# Patient Record
Sex: MALE | Race: WHITE | NOT HISPANIC OR LATINO
[De-identification: names, ages, dates, MRNs, and addresses within clinical notes are randomized per-mention and may not be internally consistent; named-entity substitution may affect disease eponyms.]

---

## 2019-11-21 PROBLEM — Z00.00 ENCOUNTER FOR PREVENTIVE HEALTH EXAMINATION: Status: ACTIVE | Noted: 2019-11-21

## 2019-11-22 ENCOUNTER — APPOINTMENT (OUTPATIENT)
Dept: CARDIOLOGY | Facility: CLINIC | Age: 34
End: 2019-11-22
Payer: COMMERCIAL

## 2019-11-22 ENCOUNTER — NON-APPOINTMENT (OUTPATIENT)
Age: 34
End: 2019-11-22

## 2019-11-22 VITALS
TEMPERATURE: 98.3 F | BODY MASS INDEX: 27.08 KG/M2 | OXYGEN SATURATION: 100 % | SYSTOLIC BLOOD PRESSURE: 132 MMHG | DIASTOLIC BLOOD PRESSURE: 92 MMHG | WEIGHT: 211 LBS | HEIGHT: 74 IN | RESPIRATION RATE: 12 BRPM | HEART RATE: 69 BPM

## 2019-11-22 DIAGNOSIS — Z82.49 FAMILY HISTORY OF ISCHEMIC HEART DISEASE AND OTHER DISEASES OF THE CIRCULATORY SYSTEM: ICD-10-CM

## 2019-11-22 DIAGNOSIS — Z87.19 PERSONAL HISTORY OF OTHER DISEASES OF THE DIGESTIVE SYSTEM: ICD-10-CM

## 2019-11-22 PROCEDURE — 99204 OFFICE O/P NEW MOD 45 MIN: CPT

## 2019-11-22 PROCEDURE — 93000 ELECTROCARDIOGRAM COMPLETE: CPT | Mod: 59

## 2019-11-22 PROCEDURE — 0296T: CPT

## 2019-11-23 PROBLEM — Z87.19 HISTORY OF GASTROESOPHAGEAL REFLUX (GERD): Status: RESOLVED | Noted: 2019-11-23 | Resolved: 2019-11-23

## 2019-11-23 PROBLEM — Z82.49 FAMILY HISTORY OF ATRIAL FIBRILLATION: Status: ACTIVE | Noted: 2019-11-23

## 2019-11-23 PROBLEM — Z82.49 FAMILY HISTORY OF ESSENTIAL HYPERTENSION: Status: ACTIVE | Noted: 2019-11-23

## 2019-11-23 RX ORDER — ESOMEPRAZOLE MAGNESIUM 20 MG/1
20 CAPSULE, DELAYED RELEASE ORAL
Refills: 0 | Status: ACTIVE | COMMUNITY

## 2019-11-23 NOTE — ASSESSMENT
[FreeTextEntry1] : 35 yo male with intermittent palpitations. His symptoms of intermittent palpitations may be related to stress and physical deconditioning. However, cannot exclude premature beats or tachyarrhythmias.\par \par Will perform Zio XT monitor to evaluate for arrhythmias.\par Will perform echocardiogram to assess LV function and structural heart disease.\par Will perform treadmill stress ECG to evaluate for stress induced arrhythmias.\par Pending review of test results, will determine if further cardiac work-up or intervention is clinically indicated.\par

## 2019-11-23 NOTE — PHYSICAL EXAM
[General Appearance - Well Developed] : well developed [General Appearance - Well Nourished] : well nourished [No Oral Cyanosis] : no oral cyanosis [General Appearance - In No Acute Distress] : no acute distress [Normal Conjunctiva] : the conjunctiva exhibited no abnormalities [Normal Rate] : normal [Normal S1] : normal S1 [Rhythm Regular] : regular [Normal S2] : normal S2 [No Murmur] : no murmurs heard [Respiration, Rhythm And Depth] : normal respiratory rhythm and effort [Bowel Sounds] : normal bowel sounds [Abdomen Tenderness] : non-tender [Abdomen Soft] : soft [] : no hepato-splenomegaly [Abnormal Walk] : normal gait [Nail Clubbing] : no clubbing of the fingernails [Oriented To Time, Place, And Person] : oriented to person, place, and time [Cyanosis, Localized] : no localized cyanosis [Mood] : the mood was normal [Affect] : the affect was normal [S3] : no S3 [FreeTextEntry1] : No JVD present. [S4] : no S4

## 2019-11-23 NOTE — HISTORY OF PRESENT ILLNESS
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 He recently reports waking up during his midnight shift at work with irregular heart beat/palpitations which lasted for 8-12 hours before resolving. He denied any associated lightheadedness/chest pain/dyspnea. He also reports episodes in the past where he would feel palpitations/increased heart rate when getting up from a seated/supine position. He was running regularly up until 1 year ago but has recently started exercising regularly again. He notes that his HR will jump up to 180s when using stationary bicycle at the gym, which is unusual for him in the past. He drinks a 16 oz coffee in the morning and a cup of tea in the afternoon. He reports having a normal TSH in Feb 2019.

## 2019-11-25 ENCOUNTER — MESSAGE (OUTPATIENT)
Age: 34
End: 2019-11-25

## 2019-12-02 ENCOUNTER — MESSAGE (OUTPATIENT)
Age: 34
End: 2019-12-02

## 2019-12-10 ENCOUNTER — RESULT CHARGE (OUTPATIENT)
Age: 34
End: 2019-12-10

## 2019-12-10 PROCEDURE — 0298T: CPT

## 2019-12-11 ENCOUNTER — APPOINTMENT (OUTPATIENT)
Dept: CARDIOLOGY | Facility: CLINIC | Age: 34
End: 2019-12-11
Payer: COMMERCIAL

## 2019-12-11 DIAGNOSIS — R00.2 PALPITATIONS: ICD-10-CM

## 2019-12-11 PROCEDURE — 93015 CV STRESS TEST SUPVJ I&R: CPT

## 2019-12-12 ENCOUNTER — APPOINTMENT (OUTPATIENT)
Dept: CARDIOLOGY | Facility: CLINIC | Age: 34
End: 2019-12-12

## 2020-01-09 ENCOUNTER — APPOINTMENT (OUTPATIENT)
Dept: CARDIOLOGY | Facility: CLINIC | Age: 35
End: 2020-01-09

## 2024-05-15 ENCOUNTER — APPOINTMENT (OUTPATIENT)
Dept: GASTROENTEROLOGY | Facility: CLINIC | Age: 39
End: 2024-05-15